# Patient Record
Sex: MALE | Race: OTHER | Employment: UNEMPLOYED | URBAN - METROPOLITAN AREA
[De-identification: names, ages, dates, MRNs, and addresses within clinical notes are randomized per-mention and may not be internally consistent; named-entity substitution may affect disease eponyms.]

---

## 2024-03-20 ENCOUNTER — HOSPITAL ENCOUNTER (EMERGENCY)
Facility: HOSPITAL | Age: 29
Discharge: HOME | End: 2024-03-20
Attending: EMERGENCY MEDICINE

## 2024-03-20 VITALS
TEMPERATURE: 98.2 F | HEART RATE: 97 BPM | OXYGEN SATURATION: 99 % | RESPIRATION RATE: 18 BRPM | DIASTOLIC BLOOD PRESSURE: 80 MMHG | WEIGHT: 215 LBS | SYSTOLIC BLOOD PRESSURE: 124 MMHG

## 2024-03-20 DIAGNOSIS — N48.9 PENILE LESION: Primary | ICD-10-CM

## 2024-03-20 DIAGNOSIS — R21 PENILE RASH: ICD-10-CM

## 2024-03-20 PROCEDURE — 99283 EMERGENCY DEPT VISIT LOW MDM: CPT

## 2024-03-20 RX ORDER — DOXYCYCLINE HYCLATE 100 MG
100 TABLET ORAL 2 TIMES DAILY
Qty: 14 TABLET | Refills: 0 | Status: SHIPPED | OUTPATIENT
Start: 2024-03-20 | End: 2024-03-27

## 2024-03-20 ASSESSMENT — LIFESTYLE VARIABLES
HOW OFTEN DO YOU HAVE A DRINK CONTAINING ALCOHOL: NEVER
HOW MANY STANDARD DRINKS CONTAINING ALCOHOL DO YOU HAVE ON A TYPICAL DAY: PATIENT DOES NOT DRINK

## 2024-03-20 ASSESSMENT — PAIN - FUNCTIONAL ASSESSMENT: PAIN_FUNCTIONAL_ASSESSMENT: NONE - DENIES PAIN

## 2024-03-21 NOTE — ED PROVIDER NOTES
[facility] EMERGENCY DEPT  EMERGENCY DEPARTMENT ENCOUNTER    Patient Name: Young Cool  MRN: [id number]  YOB: 1995  Provider: Khang Levy MD  PCP: No primary care provider on file.  Time/Date of evaluation: 10:30 PM EDT on 3/20/24    History of Presenting Illness     Chief Complaint   Patient presents with    Rash     History Provided by: Patient and AMN (iPad) , Language:     History is limited by: Nothing and Primarily Non-English Speaking    HISTORY (Narrative):   Young Cool is a 28 y.o. male with no contributory PMHx who presents to the emergency department (room 8) by POV C/O rash to the shaft of the penis and suprapubic abdomen for the past month.  Patient denies any itching or pain.  He also denies any urethral discharge or concern for STI.  He does tell me he sweats quite a bit.  He is sexually active with his wife and denies having any other sexual partners.    Nursing Notes were all reviewed and agreed with or any disagreements were addressed in the HPI.    Past History     PAST MEDICAL HISTORY:  History reviewed. No pertinent past medical history.    PAST SURGICAL HISTORY:  History reviewed. No pertinent surgical history.    FAMILY HISTORY:  History reviewed. No pertinent family history.    SOCIAL HISTORY:  Social History     Tobacco Use    Smoking status: Never    Smokeless tobacco: Never   Vaping Use    Vaping Use: Never used   Substance Use Topics    Alcohol use: Never    Drug use: Never       MEDICATIONS:  No current facility-administered medications on file prior to encounter.     No current outpatient medications on file prior to encounter.       ALLERGIES:  No Known Allergies    SOCIAL DETERMINANTS OF HEALTH:  Social Determinants of Health     Tobacco Use: Low Risk  (3/20/2024)    Patient History     Smoking Tobacco Use: Never     Smokeless Tobacco Use: Never     Passive Exposure: Not on file   Alcohol Use: Not At Risk (3/20/2024)

## 2024-03-21 NOTE — ED NOTES
Pt presents ambulatory to ED complaining of rash x 1.5 months. Pt reports worst after sweating. White lumps with redness. Denies STD concerns and UTI.      Pt is alert and oriented x 4, RR even and unlabored, skin is warm and dry. Assesment completed and pt updated on plan of care.       Emergency Department Nursing Plan of Care       The Nursing Plan of Care is developed from the Nursing assessment and Emergency Department Attending provider initial evaluation.  The plan of care may be reviewed in the “ED Provider note”.    The Plan of Care was developed with the following considerations:   Patient / Family readiness to learn indicated by:verbalized understanding  Persons(s) to be included in education: patient  Barriers to Learning/Limitations:None    Signed     Any Garza RN    3/20/2024   10:23 PM

## 2024-04-02 ENCOUNTER — HOSPITAL ENCOUNTER (EMERGENCY)
Facility: HOSPITAL | Age: 29
Discharge: HOME | End: 2024-04-02

## 2024-04-02 VITALS
BODY MASS INDEX: 29.65 KG/M2 | RESPIRATION RATE: 18 BRPM | HEART RATE: 73 BPM | SYSTOLIC BLOOD PRESSURE: 126 MMHG | WEIGHT: 231 LBS | DIASTOLIC BLOOD PRESSURE: 62 MMHG | HEIGHT: 74 IN | TEMPERATURE: 98.7 F | OXYGEN SATURATION: 97 %

## 2024-04-02 DIAGNOSIS — R21 RASH OF GENITAL AREA: Primary | ICD-10-CM

## 2024-04-02 PROCEDURE — 99282 EMERGENCY DEPT VISIT SF MDM: CPT

## 2024-04-02 ASSESSMENT — PAIN - FUNCTIONAL ASSESSMENT: PAIN_FUNCTIONAL_ASSESSMENT: 0-10

## 2024-04-02 ASSESSMENT — PAIN SCALES - GENERAL: PAINLEVEL_OUTOF10: 0

## 2024-04-02 NOTE — ED NOTES
Patient is Salvadorean speaking.   Brittany, #[id number] used for initial RN and provider assessments, discussions.

## 2024-04-02 NOTE — DISCHARGE INSTRUCTIONS
Clinics     [facility] [phone]   [facility]   [phone]   [facility]   [phone]   [facility]: [facility] [phone]       [facility] [phone]   [facility]   [phone]   [facility]   [phone]   [facility]     [phone]   [facility]   [phone]   [facility] [phone]

## 2024-04-02 NOTE — ED PROVIDER NOTES
patient autonomously. My supervision physician was on site and available for consultation if needed.     I am the Primary Clinician of Record.   oN Thibodeaux PA-C (electronically signed)    (Please note that parts of this dictation were completed with voice recognition software. Quite often unanticipated grammatical, syntax, homophones, and other interpretive errors are inadvertently transcribed by the computer software. Please disregards these errors. Please excuse any errors that have escaped final proofreading.)       No Thibodeaux PA-C  04/02/24 [id number]

## 2024-04-02 NOTE — ED TRIAGE NOTES
Pt reports rash to genitals, took antibiotics without relief. Pt denies pain or itching.  number [id number]